# Patient Record
Sex: MALE | Race: ASIAN | NOT HISPANIC OR LATINO
[De-identification: names, ages, dates, MRNs, and addresses within clinical notes are randomized per-mention and may not be internally consistent; named-entity substitution may affect disease eponyms.]

---

## 2020-11-19 ENCOUNTER — APPOINTMENT (OUTPATIENT)
Dept: UROLOGY | Facility: CLINIC | Age: 50
End: 2020-11-19
Payer: COMMERCIAL

## 2020-11-19 DIAGNOSIS — D30.00 BENIGN NEOPLASM OF UNSPECIFIED KIDNEY: ICD-10-CM

## 2020-11-19 DIAGNOSIS — R82.90 UNSPECIFIED ABNORMAL FINDINGS IN URINE: ICD-10-CM

## 2020-11-19 DIAGNOSIS — R93.429 ABNORMAL RADIOLOGIC FINDINGS ON DIAGNOSITIC IMAGING OF UNSPECIFIED KIDNEY: ICD-10-CM

## 2020-11-19 PROCEDURE — 99204 OFFICE O/P NEW MOD 45 MIN: CPT

## 2020-11-19 NOTE — LETTER BODY
[FreeTextEntry1] : Sharonda Vieira MD\par [address] [facility]\[place] [address]\par P: [phone]\par F: [phone]\par \par Dear Dr. Vieira,\par \par Reason for visit: Abnormal imaging. 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 Patient is accompanied by his wife today. He was last seen by me 4 years ago. He denies any flank pain, hematuria, or urinary difficulties. The patient denies any urinary incontinence. He denies any aggravating or relieving factors. The patient denies any interference of function. The patient is entirely asymptomatic. All other review of systems are negative. He has no cancer in his family medical history. He has no previous surgical history. Past medical history, family history and social history were inquired and were noncontributory to current condition. The patient does not use tobacco or drink alcohol. Medications and allergies were reviewed. He has no known allergies to medication.\par \par On examination, the patient is a healthy-appearing gentleman in no acute distress. He is alert and oriented follows commands. He has normal mood and affect. He is normocephalic. Neck is supple. Respirations are unlabored. His abdomen is soft and nontender. Bladder is nonpalpable. No CVA tenderness. Neurologically he is grossly intact. No peripheral edema. Skin without gross abnormality. He has normal male external genitalia. Normal meatus. Bilateral testes are descended intrascrotally and normal to palpation. On rectal examination, there is normal sphincter tone. The prostate is clinically benign without focal induration or nodularity.\par \par I personally reviewed the patient's recent ultrasound today. 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 I recommended the patient obtain a CT abdomen to evaluate for his right renal angiomyolipoma and complex right renal cyst. I counseled the patient regarding the procedure. The risks and benefits were discussed. Alternatives were given. I answered the patient's questions. The patient will take the necessary preparations for the procedure. The patient will repeat BMP and PSA today to ensure stability. Risks and alternatives were discussed. I answered the patient questions. The patient will follow-up as directed and will contact me with any questions or concerns. Thank you for the opportunity to participate in the care of Mr. GOLD. I will keep you updated on his progress.\par \par Plan: CT urogram. BMP. PSA. Follow-up as directed.

## 2020-11-19 NOTE — ADDENDUM
[FreeTextEntry1] : Entered by Robert Lozoya, acting as scribe for Dr. Dean Joy.\par \par The documentation recorded by the scribe accurately reflects the service I personally performed and the decisions made by me.\par

## 2020-11-20 LAB
ANION GAP SERPL CALC-SCNC: 12 MMOL/L
BUN SERPL-MCNC: 19 MG/DL
CALCIUM SERPL-MCNC: 9.4 MG/DL
CHLORIDE SERPL-SCNC: 102 MMOL/L
CO2 SERPL-SCNC: 26 MMOL/L
CREAT SERPL-MCNC: 0.91 MG/DL
GLUCOSE SERPL-MCNC: 99 MG/DL
POTASSIUM SERPL-SCNC: 4.5 MMOL/L
PSA FREE FLD-MCNC: 34 %
PSA FREE SERPL-MCNC: 0.62 NG/ML
PSA SERPL-MCNC: 1.84 NG/ML
SODIUM SERPL-SCNC: 140 MMOL/L

## 2020-12-18 ENCOUNTER — NON-APPOINTMENT (OUTPATIENT)
Age: 50
End: 2020-12-18